# Patient Record
Sex: FEMALE | Race: WHITE
[De-identification: names, ages, dates, MRNs, and addresses within clinical notes are randomized per-mention and may not be internally consistent; named-entity substitution may affect disease eponyms.]

---

## 2019-10-22 NOTE — MMO
Bilateral MAMMO Bilat Screen [facility].

 

CLINICAL HISTORY:

Patient is 68 years old and is seen for screening. The patient has no family

history of breast cancer.  The patient has no personal history of cancer.

 

VIEWS:

The views performed were:  bilateral craniocaudal with tomosynthesis and

bilateral mediolateral oblique with tomosynthesis.

 

FILMS COMPARED:

The present examination has been compared to prior imaging studies performed at

[facility] on 06/15/2016, 08/01/2017 and 08/08/2018.

 

This study has been interpreted with the assistance of computer-aided detection.

 

MAMMOGRAM FINDINGS:

There are scattered fibroglandular densities.

 

There are stable benign appearing calcifications seen in both breasts.

 

There are no suspicious masses, suspicious calcifications, or new areas of

architectural distortion.

 

IMPRESSION:

THERE IS NO MAMMOGRAPHIC EVIDENCE OF MALIGNANCY.

 

A ROUTINE FOLLOW-UP MAMMOGRAM IN 1 YEAR IS RECOMMENDED.

 

THE RESULTS OF THIS EXAM WERE SENT TO THE PATIENT.

 

ACR BI-RADS Category 2 - Benign finding

 

MAMMOGRAPHY NOTE:

 1. A negative mammogram report should not delay a biopsy if a dominant of

 clinically suspicious mass is present.

 2. Approximately 10% to 15% of breast cancers are not detected by

 mammography.

 3. Adenosis and dense breasts may obscure an underlying neoplasm.

 

 

Reported by: MARK BIRCH MD

Electonically Signed: [id number]

## 2020-11-06 NOTE — MMO
Bilateral MAMMO Bilat Screen [facility].

 

CLINICAL HISTORY:

Patient is 69 years old and is seen for screening. The patient has no family

history of breast cancer.  The patient has no personal history of cancer.

 

VIEWS:

The views performed were:  bilateral craniocaudal with tomosynthesis and

bilateral mediolateral oblique with tomosynthesis.

 

FILMS COMPARED:

The present examination has been compared to prior imaging studies performed at

[facility] on 06/15/2016, 08/01/2017, 08/08/2018 and 10/22/2019.

 

This study has been interpreted with the assistance of computer-aided detection.

 

MAMMOGRAM FINDINGS:

There are scattered fibroglandular densities.

 

Finding 1:  There is a focal asymmetry seen in the upper-outer region of the

right breast.

 

Finding 2:  There are stable benign appearing calcifications seen in both

breasts.

 

IMPRESSION:

FINDING 1:  FOCAL ASYMMETRY IN THE RIGHT BREAST REQUIRES ADDITIONAL EVALUATION.

ADDITIONAL IMAGING.

 

ULTRASOUND MAY ALSO PROVE USEFUL AT RECALL.

 

THE RESULTS OF THIS EXAM WERE SENT TO THE PATIENT.

 

ACR BI-RADS Category 0 - Incomplete:  Need additional imaging evaluation. [facility] will notify the patient of the need for additional imaging services.

 

MAMMOGRAPHY NOTE:

 1. A negative mammogram report should not delay a biopsy if a dominant of

 clinically suspicious mass is present.

 2. Approximately 10% to 15% of breast cancers are not detected by

 mammography.

 3. Adenosis and dense breasts may obscure an underlying neoplasm.

 

 

Reported by: MARK BIRCH MD

Electonically Signed: [id number]

## 2020-11-13 NOTE — ULT
Exam:

Right breast ultrasound Limited:



HISTORY:

Follow-up abnormal mammogram



FINDINGS:

An 11:00 position approximately 3 cm from nipple there is noted to be a minimally complex area includ
ing a 0.5 cm circumscribed cyst. Immediately adjacent to this cyst there appear to be a cluster of

smaller cysts overall measuring approximately 0.4 x 0.8 cm. No evidence for significant solid mass co
mponent. There is some asymmetric glandular tissue adjacent to this area of cysts.



IMPRESSION:

BI-RADS Category 3 probably benign findings. Six-month follow-up right breast unilateral diagnostic m
ammogram and right breast ultrasound is recommended for further assessment.



Findings were discussed with the patient who was in agreement to proceeding to short-term surveillanc
e.



Reported By: Yury Redmond 

Electronically Signed:  11/13/2020 12:22 PM

## 2020-11-13 NOTE — MMO
Right Breast MAMMO Unilat Diag [facility].

 

CLINICAL HISTORY:

Patient is 69 years old and is seen for additional evaluation requested from

prior study. The patient has no family history of breast cancer.  The patient

has no personal history of cancer.

 

VIEWS:

The views performed were:  right mediolateral oblique spot compression with

tomosynthesis and right mediolateral with tomosynthesis.

 

FILMS COMPARED:

The present examination has been compared to prior imaging studies performed at

[facility] on 08/08/2018, 10/22/2019, 11/06/2020 and 11/13/2020.

 

This study has been interpreted with the assistance of computer-aided detection.

 

MAMMOGRAM FINDINGS:

There are scattered fibroglandular densities.

 

Finding 1:  There are stable benign appearing calcifications seen in the right

breast.

 

Finding 2:  There is a focal asymmetry measuring 11 millimeters seen in the

right breast at 11 o'clock.

 

Slightly denser but unchanged in size.

 

IMPRESSION:

FINDING 1:  STABLE CALCIFICATIONS IN THE RIGHT BREAST ARE BENIGN.

 

FINDING 2:  FOCAL ASYMMETRY IN THE RIGHT BREAST IS PROBABLY BENIGN. FOLLOW-UP IN

6 MONTHS IS RECOMMENDED.

 

ULTRASOUND CYSTS

 

THE RESULTS OF THIS EXAM WERE SENT TO THE PATIENT.

 

ACR BI-RADS Category 3 - Probably benign finding - short interval follow-up

suggested. [facility] will notify the patient of the need for additional

imaging services.

 

MAMMOGRAPHY NOTE:

 1. A negative mammogram report should not delay a biopsy if a dominant of

 clinically suspicious mass is present.

 2. Approximately 10% to 15% of breast cancers are not detected by

 mammography.

 3. Adenosis and dense breasts may obscure an underlying neoplasm.

 

 

Reported by: GABE LANDRY MD

Electonically Signed: [id number]

## 2021-11-16 ENCOUNTER — HOSPITAL ENCOUNTER (OUTPATIENT)
Dept: HOSPITAL 92 - BICMAMMO | Age: 71
Discharge: HOME | End: 2021-11-16
Payer: MEDICARE

## 2021-11-16 DIAGNOSIS — N63.10: Primary | ICD-10-CM

## 2021-11-16 DIAGNOSIS — N64.89: ICD-10-CM

## 2021-11-16 PROCEDURE — 77066 DX MAMMO INCL CAD BI: CPT

## 2021-11-16 PROCEDURE — 76642 ULTRASOUND BREAST LIMITED: CPT

## 2021-11-16 PROCEDURE — G0279 TOMOSYNTHESIS, MAMMO: HCPCS

## 2022-05-13 ENCOUNTER — HOSPITAL ENCOUNTER (OUTPATIENT)
Dept: HOSPITAL 92 - BICMAMMO | Age: 72
Discharge: HOME | End: 2022-05-13
Payer: MEDICARE

## 2022-05-13 DIAGNOSIS — N63.10: ICD-10-CM

## 2022-05-13 DIAGNOSIS — N64.89: ICD-10-CM

## 2022-05-13 DIAGNOSIS — R92.8: Primary | ICD-10-CM

## 2022-05-13 PROCEDURE — 76642 ULTRASOUND BREAST LIMITED: CPT

## 2022-05-13 PROCEDURE — G0279 TOMOSYNTHESIS, MAMMO: HCPCS

## 2022-05-13 PROCEDURE — 77065 DX MAMMO INCL CAD UNI: CPT

## 2022-11-15 ENCOUNTER — HOSPITAL ENCOUNTER (OUTPATIENT)
Dept: HOSPITAL 92 - BICMAMMO | Age: 72
Discharge: HOME | End: 2022-11-15
Payer: MEDICARE

## 2022-11-15 DIAGNOSIS — R92.8: Primary | ICD-10-CM

## 2022-11-15 PROCEDURE — 77066 DX MAMMO INCL CAD BI: CPT

## 2022-11-15 PROCEDURE — G0279 TOMOSYNTHESIS, MAMMO: HCPCS

## 2022-11-15 PROCEDURE — 76642 ULTRASOUND BREAST LIMITED: CPT

## 2024-12-03 ENCOUNTER — HOSPITAL ENCOUNTER (OUTPATIENT)
Dept: HOSPITAL 92 - SDC | Age: 74
End: 2024-12-03
Attending: PHYSICIAN ASSISTANT
Payer: MEDICARE

## 2024-12-03 DIAGNOSIS — I10: ICD-10-CM

## 2024-12-03 DIAGNOSIS — F32.A: ICD-10-CM

## 2024-12-03 DIAGNOSIS — Z88.0: ICD-10-CM

## 2024-12-03 DIAGNOSIS — Z88.1: ICD-10-CM

## 2024-12-03 DIAGNOSIS — Z88.8: ICD-10-CM

## 2024-12-03 DIAGNOSIS — F41.9: ICD-10-CM

## 2024-12-03 DIAGNOSIS — R13.10: Primary | ICD-10-CM

## 2024-12-03 DIAGNOSIS — K52.9: ICD-10-CM

## 2024-12-03 DIAGNOSIS — Z79.899: ICD-10-CM

## 2024-12-03 PROCEDURE — 4A0B7BZ MEASUREMENT OF GASTROINTESTINAL PRESSURE, VIA NATURAL OR ARTIFICIAL OPENING: ICD-10-PCS | Performed by: PHYSICIAN ASSISTANT

## 2024-12-03 PROCEDURE — 91010 ESOPHAGUS MOTILITY STUDY: CPT
